# Patient Record
Sex: FEMALE | Race: BLACK OR AFRICAN AMERICAN | NOT HISPANIC OR LATINO | Employment: UNEMPLOYED | URBAN - METROPOLITAN AREA
[De-identification: names, ages, dates, MRNs, and addresses within clinical notes are randomized per-mention and may not be internally consistent; named-entity substitution may affect disease eponyms.]

---

## 2023-11-06 ENCOUNTER — OFFICE VISIT (OUTPATIENT)
Dept: INTERNAL MEDICINE | Facility: CLINIC | Age: 33
End: 2023-11-06

## 2023-11-06 ENCOUNTER — LAB (OUTPATIENT)
Dept: INTERNAL MEDICINE | Facility: CLINIC | Age: 33
End: 2023-11-06

## 2023-11-06 VITALS
BODY MASS INDEX: 28.06 KG/M2 | HEIGHT: 66 IN | SYSTOLIC BLOOD PRESSURE: 90 MMHG | TEMPERATURE: 97.7 F | OXYGEN SATURATION: 97 % | WEIGHT: 174.6 LBS | DIASTOLIC BLOOD PRESSURE: 56 MMHG | HEART RATE: 86 BPM

## 2023-11-06 DIAGNOSIS — F39 MOOD DISORDER: ICD-10-CM

## 2023-11-06 DIAGNOSIS — K43.9 HERNIA OF ABDOMINAL WALL: Primary | ICD-10-CM

## 2023-11-06 DIAGNOSIS — Z00.00 WELL ADULT EXAM: ICD-10-CM

## 2023-11-06 DIAGNOSIS — Z12.4 CERVICAL CANCER SCREENING: ICD-10-CM

## 2023-11-06 LAB
ALBUMIN SERPL-MCNC: 4.1 G/DL (ref 3.5–5.2)
ALBUMIN/GLOB SERPL: 1.8 G/DL
ALP SERPL-CCNC: 63 U/L (ref 39–117)
ALT SERPL W P-5'-P-CCNC: 21 U/L (ref 1–33)
ANION GAP SERPL CALCULATED.3IONS-SCNC: 7 MMOL/L (ref 5–15)
AST SERPL-CCNC: 21 U/L (ref 1–32)
BILIRUB SERPL-MCNC: <0.2 MG/DL (ref 0–1.2)
BUN SERPL-MCNC: 14 MG/DL (ref 6–20)
BUN/CREAT SERPL: 20.9 (ref 7–25)
CALCIUM SPEC-SCNC: 9.4 MG/DL (ref 8.6–10.5)
CHLORIDE SERPL-SCNC: 104 MMOL/L (ref 98–107)
CHOLEST SERPL-MCNC: 146 MG/DL (ref 0–200)
CO2 SERPL-SCNC: 28 MMOL/L (ref 22–29)
CREAT SERPL-MCNC: 0.67 MG/DL (ref 0.57–1)
DEPRECATED RDW RBC AUTO: 44.9 FL (ref 37–54)
EGFRCR SERPLBLD CKD-EPI 2021: 119.3 ML/MIN/1.73
ERYTHROCYTE [DISTWIDTH] IN BLOOD BY AUTOMATED COUNT: 13 % (ref 12.3–15.4)
GLOBULIN UR ELPH-MCNC: 2.3 GM/DL
GLUCOSE SERPL-MCNC: 114 MG/DL (ref 65–99)
HCT VFR BLD AUTO: 34 % (ref 34–46.6)
HDLC SERPL-MCNC: 69 MG/DL (ref 40–60)
HGB BLD-MCNC: 11.6 G/DL (ref 12–15.9)
LDLC SERPL CALC-MCNC: 60 MG/DL (ref 0–100)
LDLC/HDLC SERPL: 0.86 {RATIO}
MCH RBC QN AUTO: 32.5 PG (ref 26.6–33)
MCHC RBC AUTO-ENTMCNC: 34.1 G/DL (ref 31.5–35.7)
MCV RBC AUTO: 95.2 FL (ref 79–97)
PLATELET # BLD AUTO: 255 10*3/MM3 (ref 140–450)
PMV BLD AUTO: 11.1 FL (ref 6–12)
POTASSIUM SERPL-SCNC: 4.3 MMOL/L (ref 3.5–5.2)
PROT SERPL-MCNC: 6.4 G/DL (ref 6–8.5)
RBC # BLD AUTO: 3.57 10*6/MM3 (ref 3.77–5.28)
SODIUM SERPL-SCNC: 139 MMOL/L (ref 136–145)
TRIGL SERPL-MCNC: 90 MG/DL (ref 0–150)
TSH SERPL DL<=0.05 MIU/L-ACNC: 2.26 UIU/ML (ref 0.27–4.2)
VLDLC SERPL-MCNC: 17 MG/DL (ref 5–40)
WBC NRBC COR # BLD: 6.1 10*3/MM3 (ref 3.4–10.8)

## 2023-11-06 PROCEDURE — 86803 HEPATITIS C AB TEST: CPT | Performed by: STUDENT IN AN ORGANIZED HEALTH CARE EDUCATION/TRAINING PROGRAM

## 2023-11-06 PROCEDURE — 36415 COLL VENOUS BLD VENIPUNCTURE: CPT | Performed by: STUDENT IN AN ORGANIZED HEALTH CARE EDUCATION/TRAINING PROGRAM

## 2023-11-06 PROCEDURE — 80061 LIPID PANEL: CPT | Performed by: STUDENT IN AN ORGANIZED HEALTH CARE EDUCATION/TRAINING PROGRAM

## 2023-11-06 PROCEDURE — 80050 GENERAL HEALTH PANEL: CPT | Performed by: STUDENT IN AN ORGANIZED HEALTH CARE EDUCATION/TRAINING PROGRAM

## 2023-11-06 RX ORDER — DIVALPROEX SODIUM 500 MG/1
500 TABLET, DELAYED RELEASE ORAL NIGHTLY
COMMUNITY

## 2023-11-06 RX ORDER — OLANZAPINE 10 MG/1
20 TABLET ORAL NIGHTLY
COMMUNITY

## 2023-11-06 RX ORDER — MIRTAZAPINE 15 MG/1
15 TABLET, ORALLY DISINTEGRATING ORAL NIGHTLY
COMMUNITY

## 2023-11-06 RX ORDER — TIZANIDINE 2 MG/1
2 TABLET ORAL NIGHTLY PRN
Qty: 30 TABLET | Refills: 0 | Status: SHIPPED | OUTPATIENT
Start: 2023-11-06

## 2023-11-06 NOTE — PROGRESS NOTES
Office Note     Name: Luli Calzada    : 1990     MRN: [id number]     Chief Complaint  Hernia, Anxiety (Phq 4 /), and Depression (Phq 4 )    Subjective     History of Present Illness:  Luli Calzada is a 33 y.o. female who presents today for     Mood disorder,   Patient was in [facility] for a month, prior to that she was in group home and started having delusions  She has never been on Zyprexa, mirtazapine and olanzapine.  Says she feels okay, she is sleeping okay.  Staying at [facility], per patient she see a psychiatrist at the [facility].     Has had a hernia, present since . Increases in size with strained, no acute pain today, no nausea, no vomiting,  Hx of multiple C-sections            PHQ-9 Depression Screening  Little interest or pleasure in doing things? 1-->several days   Feeling down, depressed, or hopeless? 1-->several days   Trouble falling or staying asleep, or sleeping too much? 0-->not at all   Feeling tired or having little energy? 2-->more than half the days   Poor appetite or overeating? 0-->not at all   Feeling bad about yourself - or that you are a failure or have let yourself or your family down? 0-->not at all   Trouble concentrating on things, such as reading the newspaper or watching television? 0-->not at all   Moving or speaking so slowly that other people could have noticed? Or the opposite - being so fidgety or restless that you have been moving around a lot more than usual? 0-->not at all   Thoughts that you would be better off dead, or of hurting yourself in some way? 0-->not at all   PHQ-9 Total Score 4   If you checked off any problems, how difficult have these problems made it for you to do your work, take care of things at home, or get along with other people? not difficult at all       Anxiety WERNER-7    Feeling nervous, anxious or on edge: Several days  Not being able to stop or control worrying: Not at all  Worrying too much about different  things: Several days  Trouble Relaxing: Not at all  Being so restless that it is hard to sit still: Several days  Becoming easily annoyed or irritable: Several days  Feeling afraid as if something awful might happen: Not at all  WERNER 7 Total Score: 4  If you checked any problems, how difficult have these problems made it for you to do your work, take care of things at home, or get along with other people: Not difficult at all         Review of Systems:   Review of Systems   All other systems reviewed and are negative.      Past Medical History:   Past Medical History:   Diagnosis Date    Anxiety     Depression     Dizziness        Past Surgical History:   Past Surgical History:   Procedure Laterality Date     SECTION      EYE SURGERY Left 2016       Family History: History reviewed. No pertinent family history.    Social History:   Social History     Socioeconomic History    Marital status:    Tobacco Use    Smoking status: Every Day     Packs/day: 1.00     Years: 18.00     Additional pack years: 0.00     Total pack years: 18.00     Types: Cigarettes    Smokeless tobacco: Never   Vaping Use    Vaping Use: Former   Substance and Sexual Activity    Alcohol use: Not Currently     Comment: quit 24    Drug use: Not Currently     Types: Methamphetamines, Marijuana     Comment: quit 24    Sexual activity: Defer       Immunizations:   Immunization History   Administered Date(s) Administered    Fluzone (or Fluarix & Flulaval for VFC) >6mos 2018, 02/10/2020    Tdap 2018        Medications:     Current Outpatient Medications:     divalproex (DEPAKOTE) 500 MG DR tablet, Take 1 tablet by mouth Every Night., Disp: , Rfl:     mirtazapine (REMERON SOL-TAB) 15 MG disintegrating tablet, Place 1 tablet on the tongue Every Night., Disp: , Rfl:     OLANZapine (zyPREXA) 10 MG tablet, Take 2 tablets by mouth Every Night., Disp: , Rfl:     tiZANidine (ZANAFLEX) 2 MG tablet, Take 1 tablet by mouth At  "Night As Needed for Muscle Spasms., Disp: 30 tablet, Rfl: 0    Allergies:   No Known Allergies    Objective     Vital Signs  BP 90/56   Pulse 86   Temp 97.7 °F (36.5 °C) (Temporal)   Ht 166.4 cm (65.5\")   Wt 79.2 kg (174 lb 9.6 oz)   SpO2 97%   BMI 28.61 kg/m²   Estimated body mass index is 28.61 kg/m² as calculated from the following:    Height as of this encounter: 166.4 cm (65.5\").    Weight as of this encounter: 79.2 kg (174 lb 9.6 oz).          Physical Exam  Constitutional:       Appearance: Normal appearance. She is normal weight.   Cardiovascular:      Rate and Rhythm: Normal rate and regular rhythm.      Pulses: Normal pulses.      Heart sounds: Normal heart sounds.   Pulmonary:      Effort: Pulmonary effort is normal.      Breath sounds: Normal breath sounds.   Abdominal:      General: Abdomen is flat.      Palpations: Abdomen is soft.      Hernia: A hernia is present.   Neurological:      Mental Status: She is alert.          Result Review :                  Assessment and Plan     1. Hernia of abdominal wall    - Ambulatory Referral to General Surgery    2. Mood disorder  Mood stable  Currently on Depakote, Zyprexa 10mg and remeron 15mg  Patient is currently seeing a psychiatrist at [facility]    3. Cervical cancer screening    - HCV Antibody Rfx To Qnt PCR; Future  - Ambulatory Referral to Gynecology    4. Well adult exam    - Comprehensive metabolic panel; Future  - Lipid panel; Future  - TSH Rfx On Abnormal To Free T4; Future  - CBC No Differential; Future       Follow Up  No follow-ups on file.    Francesco Coleman MD  [facility] [address]  [facility]  [address]  [address] [address] [phone]    "

## 2023-11-06 NOTE — LETTER
November 6, 2023     Patient: Luli Calzada   YOB: 1990   Date of Visit: 11/6/2023       To Whom It May Concern:    It is my medical opinion that Luli Calzada will need to be excused from any activities today(11/6/2023) due to her medical condition          Sincerely,        Francesco Coleman MD    CC: No Recipients

## 2023-11-07 LAB
HCV AB SERPL QL IA: NORMAL
HCV IGG SERPL QL IA: NON REACTIVE

## 2023-11-10 ENCOUNTER — PATIENT ROUNDING (BHMG ONLY) (OUTPATIENT)
Dept: INTERNAL MEDICINE | Facility: CLINIC | Age: 33
End: 2023-11-10

## 2023-11-16 ENCOUNTER — TELEPHONE (OUTPATIENT)
Dept: OBSTETRICS AND GYNECOLOGY | Facility: CLINIC | Age: 33
End: 2023-11-16

## 2023-12-15 ENCOUNTER — TELEPHONE (OUTPATIENT)
Dept: INTERNAL MEDICINE | Facility: CLINIC | Age: 33
End: 2023-12-15

## 2023-12-15 NOTE — TELEPHONE ENCOUNTER
Attempted to call pt, lft vm with cb number to inform of lab results     OK FOR HUB TO RELAY MESSAGE BELOW   - Message from Francesco Coleman MD sent at 12/14/2023  8:37 PM EST -----  Please let patient know     Her labs were within acceptable ranges  Kidney function, electrolytes were normal  Cholesterol are within a normal range  Thyroid hormones are normal     Her hepatitis C was negative .

## 2023-12-15 NOTE — TELEPHONE ENCOUNTER
----- Message from Francesco Coleman MD sent at 12/14/2023  8:37 PM EST -----  Please let patient know    Her labs were within acceptable ranges  Kidney function, electrolytes were normal  Cholesterol are within a normal range  Thyroid hormones are normal    Her hepatitis C was negative .

## 2023-12-18 ENCOUNTER — TELEPHONE (OUTPATIENT)
Dept: INTERNAL MEDICINE | Facility: CLINIC | Age: 33
End: 2023-12-18

## 2023-12-18 NOTE — TELEPHONE ENCOUNTER
Attempted to call pt, lft message with cb number to inform of lab results      OK FOR HUB TO RELAY MESSAGE BELOW   - Message from Francesco Coleman MD sent at 12/14/2023  8:37 PM EST -----  Please let patient know     Her labs were within acceptable ranges  Kidney function, electrolytes were normal  Cholesterol are within a normal range  Thyroid hormones are normal     Her hepatitis C was negative .

## 2023-12-18 NOTE — LETTER
December 22, 2023    Luli Calzada  [address] [facility] [address]      We have been trying to reach you to inform you of your lab results.   Your labs were within acceptable ranges,Kidney function, electrolytes were normal  Cholesterol are within a normal range  Thyroid hormones are normal and  hepatitis C was negative .    If you have any questions you can call the office @ [phone].                            Francesco Coleman MD

## 2023-12-20 NOTE — TELEPHONE ENCOUNTER
2ND Attempt to call pt, lft message with cb number to inform of lab results      OK FOR HUB TO RELAY MESSAGE BELOW   - Message from Francesco Coleman MD sent at 12/14/2023  8:37 PM EST -----  Please let patient know     Her labs were within acceptable ranges  Kidney function, electrolytes were normal  Cholesterol are within a normal range  Thyroid hormones are normal     Her hepatitis C was negative .